# Patient Record
Sex: FEMALE | Race: WHITE
[De-identification: names, ages, dates, MRNs, and addresses within clinical notes are randomized per-mention and may not be internally consistent; named-entity substitution may affect disease eponyms.]

---

## 2020-01-07 ENCOUNTER — HOSPITAL ENCOUNTER (EMERGENCY)
Dept: HOSPITAL 63 - ER | Age: 25
Discharge: HOME | End: 2020-01-07
Payer: COMMERCIAL

## 2020-01-07 VITALS — DIASTOLIC BLOOD PRESSURE: 85 MMHG | SYSTOLIC BLOOD PRESSURE: 148 MMHG

## 2020-01-07 VITALS — WEIGHT: 220.46 LBS | HEIGHT: 65 IN | BODY MASS INDEX: 36.73 KG/M2

## 2020-01-07 DIAGNOSIS — E86.0: Primary | ICD-10-CM

## 2020-01-07 DIAGNOSIS — G89.18: ICD-10-CM

## 2020-01-07 DIAGNOSIS — Z90.89: ICD-10-CM

## 2020-01-07 LAB
ALBUMIN SERPL-MCNC: 3.2 G/DL (ref 3.4–5)
ALBUMIN/GLOB SERPL: 0.8 {RATIO} (ref 1–1.7)
ALP SERPL-CCNC: 96 U/L (ref 46–116)
ALT SERPL-CCNC: 41 U/L (ref 14–59)
ANION GAP SERPL CALC-SCNC: 6 MMOL/L (ref 6–14)
AST SERPL-CCNC: 37 U/L (ref 15–37)
BASOPHILS # BLD AUTO: 0 X10^3/UL (ref 0–0.2)
BASOPHILS NFR BLD: 0 % (ref 0–3)
BILIRUB SERPL-MCNC: 0.5 MG/DL (ref 0.2–1)
BUN/CREAT SERPL: 12 (ref 6–20)
CA-I SERPL ISE-MCNC: 7 MG/DL (ref 7–20)
CALCIUM SERPL-MCNC: 8.7 MG/DL (ref 8.5–10.1)
CHLORIDE SERPL-SCNC: 103 MMOL/L (ref 98–107)
CO2 SERPL-SCNC: 23 MMOL/L (ref 21–32)
CREAT SERPL-MCNC: 0.6 MG/DL (ref 0.6–1)
EOSINOPHIL NFR BLD: 0.2 X10^3/UL (ref 0–0.7)
EOSINOPHIL NFR BLD: 1 % (ref 0–3)
ERYTHROCYTE [DISTWIDTH] IN BLOOD BY AUTOMATED COUNT: 12.8 % (ref 11.5–14.5)
GFR SERPLBLD BASED ON 1.73 SQ M-ARVRAT: 122.8 ML/MIN
GLOBULIN SER-MCNC: 3.8 G/DL (ref 2.2–3.8)
GLUCOSE SERPL-MCNC: 135 MG/DL (ref 70–99)
HCT VFR BLD CALC: 37.8 % (ref 36–47)
HGB BLD-MCNC: 13.1 G/DL (ref 12–15.5)
LYMPHOCYTES # BLD: 0.8 X10^3/UL (ref 1–4.8)
LYMPHOCYTES NFR BLD AUTO: 8 % (ref 24–48)
MCH RBC QN AUTO: 31 PG (ref 25–35)
MCHC RBC AUTO-ENTMCNC: 35 G/DL (ref 31–37)
MCV RBC AUTO: 91 FL (ref 79–100)
MONO #: 0.8 X10^3/UL (ref 0–1.1)
MONOCYTES NFR BLD: 7 % (ref 0–9)
NEUT #: 9 X10^3UL (ref 1.8–7.7)
NEUTROPHILS NFR BLD AUTO: 83 % (ref 31–73)
PLATELET # BLD AUTO: 269 X10^3/UL (ref 140–400)
POTASSIUM SERPL-SCNC: 3.6 MMOL/L (ref 3.5–5.1)
PROT SERPL-MCNC: 7 G/DL (ref 6.4–8.2)
RBC # BLD AUTO: 4.16 X10^6/UL (ref 3.5–5.4)
SODIUM SERPL-SCNC: 132 MMOL/L (ref 136–145)
WBC # BLD AUTO: 10.8 X10^3/UL (ref 4–11)

## 2020-01-07 PROCEDURE — 96376 TX/PRO/DX INJ SAME DRUG ADON: CPT

## 2020-01-07 PROCEDURE — 96375 TX/PRO/DX INJ NEW DRUG ADDON: CPT

## 2020-01-07 PROCEDURE — 96361 HYDRATE IV INFUSION ADD-ON: CPT

## 2020-01-07 PROCEDURE — 99284 EMERGENCY DEPT VISIT MOD MDM: CPT

## 2020-01-07 PROCEDURE — 96374 THER/PROPH/DIAG INJ IV PUSH: CPT

## 2020-01-07 PROCEDURE — 80053 COMPREHEN METABOLIC PANEL: CPT

## 2020-01-07 PROCEDURE — 85025 COMPLETE CBC W/AUTO DIFF WBC: CPT

## 2020-01-07 PROCEDURE — 36415 COLL VENOUS BLD VENIPUNCTURE: CPT

## 2020-01-07 NOTE — PHYS DOC
Past History


Past Medical History:  No Pertinent History


Past Surgical History:  Tonsillectomy, Other


Alcohol Use:  Rarely


Drug Use:  None





Adult General


Chief Complaint


Chief Complaint:  NAUSEA/VOMITING/DIARRHEA





HPI


HPI


24-year-old female presents with nausea, vomiting after tonsil surgery. She had 

her tonsils removed on Thursday. She has had several episodes of vomiting and 

has been unable to keep down any solids, liquids, or her medication last 1 day. 

She is concerned about dehydration. When she takes her pain medicine, she vomits

not long after. Her pain was not well controlled but she is vomiting up her 

medicine. She does not have anymore nausea medicine. She denies fever or chills.

She denies bleeding.





Review of Systems


Review of Systems





Constitutional: Denies fever or chills []


Eyes: Denies change in visual acuity, redness, or eye pain []


HENT:  sore throat []


Respiratory: Denies cough or shortness of breath []


Cardiovascular: No additional information not addressed in HPI []


GI: nausea, vomiting.  Denies abdominal pain, bloody stools or diarrhea []


: Denies dysuria or hematuria []


Musculoskeletal: Denies back pain or joint pain []


Integument: Denies rash or skin lesions []


Neurologic: Denies headache, focal weakness or sensory changes []


Endocrine: Denies polyuria or polydipsia []





All other systems were reviewed and found to be within normal limits, except as 

documented in this note.





Current Medications


Current Medications





Current Medications








 Medications


  (Trade)  Dose


 Ordered  Sig/Anila  Start Time


 Stop Time Status Last Admin


Dose Admin


 


 Morphine Sulfate


  (Morphine 4mg


 Syringe)  4 mg  1X  ONCE  1/7/20 08:00


 1/7/20 08:01 DC  





 


 Ondansetron HCl


  (Zofran)  4 mg  1X  ONCE  1/7/20 08:00


 1/7/20 08:01 DC  





 


 Sodium Chloride  1,000 ml @ 


 1,000 mls/hr  1X  ONCE  1/7/20 08:00


 1/7/20 08:59   














Allergies


Allergies





Allergies








Coded Allergies Type Severity Reaction Last Updated Verified


 


  No Known Drug Allergies    1/27/15 No











Physical Exam


Physical Exam





Constitutional: Well developed, obese, well nourished, no acute distress, non-

toxic appearance. []


HENT: Normocephalic, atraumatic, bilateral external ears normal, oropharynx nelson

st, tonsillar fossils with whitish/gray film. No obvious signs of bleeding. []


Eyes: PERRLA, EOMI, conjunctiva normal, no discharge. [] 


Neck: Normal range of motion, anterior cervical tenderness, supple, no stridor. 

[] 


Cardiovascular:Heart rate 130, regular rhythm, no murmur []


Lungs & Thorax:  Bilateral breath sounds clear to auscultation []


Abdomen: Bowel sounds normal, soft, no tenderness, no masses, no pulsatile 

masses. [] 


Skin: Warm, dry, no erythema, no rash. [] 


Back: No tenderness, no CVA tenderness. [] 


Extremities: No tenderness, no cyanosis, no clubbing, ROM intact, no edema. [] 


Neurologic: Alert and oriented X 3, normal motor function, normal sensory 

function, no focal deficits noted. []


Psychologic: Affect normal, judgement normal, mood normal. []





Current Patient Data


Vital Signs





                                   Vital Signs








  Date Time  Temp Pulse Resp B/P (MAP) Pulse Ox O2 Delivery O2 Flow Rate FiO2


 


1/7/20 07:50 98.2 127 16  98 Room Air  











EKG


EKG


[]





Radiology/Procedures


Radiology/Procedures


[]





Course & Med Decision Making


Course & Med Decision Making


Pertinent Labs and Imaging studies reviewed. (See chart for details)


The patient appears dehydrated and uncomfortable. I will give her a liter normal

 saline, 4 mg of Zofran, 4 mg of morphine. The patient's labs are unremarkable 

except for slightly low sodium. We have been able to control her vomiting and 

pain. We'll discharge her for her with a prescription for Zofran ODT tablets. 

She is stable for discharge at this time.


[]





Dragon Disclaimer


Dragon Disclaimer


This electronic medical record was generated, in whole or in part, using a voice

 recognition dictation system.





Departure


Departure:


Impression:  


   Primary Impression:  


   Vomiting


   Additional Impressions:  


   Postoperative pain


   Dehydration


Disposition:  01 HOME, SELF-CARE


Condition:  IMPROVED


Referrals:  


JESSICA TAVERAS (PCP)


Patient Instructions:  Nausea and Vomiting, Easy-to-Read, Tonsillectomy, Care 

After, Easy-to-Read


Scripts


Ondansetron (ONDANSETRON ODT) 4 Mg Tab.rapdis


1 TAB PO PRN Q6-8HRS PRN for VOMITING, #16 TAB


   Prov: NATHANIEL GARBER DO         1/7/20





Problem Qualifiers








   Primary Impression:  


   Vomiting


   Vomiting type:  unspecified  Vomiting Intractability:  intractable  Nausea 

   presence:  with nausea  Qualified Codes:  R11.2 - Nausea with vomiting, 

   unspecified








NATHANIEL GARBER DO                  Jan 7, 2020 08:09

## 2020-01-10 ENCOUNTER — HOSPITAL ENCOUNTER (EMERGENCY)
Dept: HOSPITAL 63 - ER | Age: 25
Discharge: HOME | End: 2020-01-10
Payer: COMMERCIAL

## 2020-01-10 VITALS — SYSTOLIC BLOOD PRESSURE: 121 MMHG | DIASTOLIC BLOOD PRESSURE: 82 MMHG

## 2020-01-10 VITALS — WEIGHT: 220.46 LBS | BODY MASS INDEX: 36.73 KG/M2 | HEIGHT: 65 IN

## 2020-01-10 DIAGNOSIS — Z90.89: ICD-10-CM

## 2020-01-10 DIAGNOSIS — J95.830: Primary | ICD-10-CM

## 2020-01-10 PROCEDURE — 99281 EMR DPT VST MAYX REQ PHY/QHP: CPT

## 2020-01-10 NOTE — PHYS DOC
Past History


Past Medical History:  No Pertinent History


Past Surgical History:  Tonsillectomy, Other


Alcohol Use:  Rarely


Drug Use:  None





Adult General


Chief Complaint


Chief Complaint:  POST-OP PROBLEM





HPI


HPI


24-year-old female returns emergency room with bleeding from her tonsils. The 

patient had a tonsillectomy 8 days ago. She actually gagged herself with her 

toothbrush today and started to have some bleeding. The bleeding did stop on its

own. It was enough blood that she decided to call her surgeon. The surgeon 

advised that she come to the emergency room to be evaluated. She's had no 

further bleeding. She is feeling fine otherwise. Her pain is much better than 

when I saw her a few days ago for intractable vomiting and pain.





Review of Systems


Review of Systems





Constitutional: Denies fever or chills []


Eyes: Denies change in visual acuity, redness, or eye pain []


HENT: Tonsil bleeding. Denies nasal congestion.[]


Respiratory: Denies cough or shortness of breath []


Cardiovascular: No additional information not addressed in HPI []


GI: Denies abdominal pain, nausea, vomiting, bloody stools or diarrhea []


: Denies dysuria or hematuria []


Musculoskeletal: Denies back pain or joint pain []


Integument: Denies rash or skin lesions []


Neurologic: Denies headache, focal weakness or sensory changes []


Endocrine: Denies polyuria or polydipsia []





All other systems were reviewed and found to be within normal limits, except as 

documented in this note.





Allergies


Allergies





Allergies








Coded Allergies Type Severity Reaction Last Updated Verified


 


  No Known Drug Allergies    1/27/15 No











Physical Exam


Physical Exam





Constitutional: Well developed, obese, well nourished, no acute distress, non-

toxic appearance. []


HENT: Normocephalic, atraumatic, bilateral external ears normal, oropharynx 

moist. Small area of dried blood in the 6 o'clock position of the left tonsillar

 fossa. There is no current active bleeding.[]


Eyes: PERRLA, EOMI, conjunctiva normal, no discharge. [] 


Neck: Normal range of motion, no tenderness, supple, no stridor. [] 


Cardiovascular:Heart rate regular rhythm, no murmur []


Lungs & Thorax:  Bilateral breath sounds clear to auscultation []


Abdomen: Bowel sounds normal, soft, no tenderness, no masses, no pulsatile 

masses. [] 


Skin: Warm, dry, no erythema, no rash. [] 


Back: No tenderness, no CVA tenderness. [] 


Extremities: No tenderness, no cyanosis, no clubbing, ROM intact, no edema. [] 


Neurologic: Alert and oriented X 3, normal motor function, normal sensory 

function, no focal deficits noted. []


Psychologic: Affect normal, judgement normal, mood normal. []





Current Patient Data


Vital Signs





                                   Vital Signs








  Date Time  Temp Pulse Resp B/P (MAP) Pulse Ox O2 Delivery O2 Flow Rate FiO2


 


1/10/20 10:05 98.3 85 16  98 Room Air  











EKG


EKG


[]





Radiology/Procedures


Radiology/Procedures


[]





Course & Med Decision Making


Course & Med Decision Making


Pertinent Labs and Imaging studies reviewed. (See chart for details)


I performed as thorough evaluation of the patient's tonsillar fossa cyst as I 

could without a scope. There was only one small area of recent bleeding in the 

inferior aspect of the left tonsillar fossa. The rest of the tonsillar fossa 

looks as expected. I do not believe the patient needs to return to a surgeon. It

 seems to have been self-limited. I have advised her that if the bleeding 

returns she should call her surgeon and consider going to a facility with a 

surgeon available. She is stable for discharge at this time.


[]





Dragon Disclaimer


Dragon Disclaimer


This electronic medical record was generated, in whole or in part, using a voice

 recognition dictation system.





Departure


Departure:


Impression:  


   Primary Impression:  


   Post-tonsillectomy hemorrhage


Disposition:  01 HOME, SELF-CARE


Condition:  STABLE


Referrals:  


JESSICA TAVERAS (PCP)


Patient Instructions:  Tonsillectomy, Adult, Care After











NATHANIEL GARBER DO                 Jose 10, 2020 10:26